# Patient Record
Sex: FEMALE | Race: WHITE | NOT HISPANIC OR LATINO | URBAN - METROPOLITAN AREA
[De-identification: names, ages, dates, MRNs, and addresses within clinical notes are randomized per-mention and may not be internally consistent; named-entity substitution may affect disease eponyms.]

---

## 2022-07-07 ENCOUNTER — EMERGENCY (EMERGENCY)
Facility: HOSPITAL | Age: 42
LOS: 0 days | Discharge: HOME | End: 2022-07-07
Attending: EMERGENCY MEDICINE
Payer: SELF-PAY

## 2022-07-07 VITALS — HEIGHT: 66 IN | WEIGHT: 130.07 LBS

## 2022-07-07 VITALS
RESPIRATION RATE: 18 BRPM | OXYGEN SATURATION: 98 % | HEART RATE: 58 BPM | DIASTOLIC BLOOD PRESSURE: 66 MMHG | TEMPERATURE: 99 F | SYSTOLIC BLOOD PRESSURE: 115 MMHG

## 2022-07-07 DIAGNOSIS — J04.0 ACUTE LARYNGITIS: ICD-10-CM

## 2022-07-07 DIAGNOSIS — R49.0 DYSPHONIA: ICD-10-CM

## 2022-07-07 DIAGNOSIS — Z20.822 CONTACT WITH AND (SUSPECTED) EXPOSURE TO COVID-19: ICD-10-CM

## 2022-07-07 DIAGNOSIS — E03.9 HYPOTHYROIDISM, UNSPECIFIED: ICD-10-CM

## 2022-07-07 LAB
RAPID RVP RESULT: SIGNIFICANT CHANGE UP
SARS-COV-2 RNA SPEC QL NAA+PROBE: SIGNIFICANT CHANGE UP

## 2022-07-07 PROCEDURE — 0225U NFCT DS DNA&RNA 21 SARSCOV2: CPT

## 2022-07-07 PROCEDURE — 99283 EMERGENCY DEPT VISIT LOW MDM: CPT

## 2022-07-07 PROCEDURE — 99284 EMERGENCY DEPT VISIT MOD MDM: CPT

## 2022-07-07 RX ORDER — DEXAMETHASONE 0.5 MG/5ML
10 ELIXIR ORAL ONCE
Refills: 0 | Status: COMPLETED | OUTPATIENT
Start: 2022-07-07 | End: 2022-07-07

## 2022-07-07 RX ORDER — DIPHENHYDRAMINE HCL 50 MG
50 CAPSULE ORAL ONCE
Refills: 0 | Status: COMPLETED | OUTPATIENT
Start: 2022-07-07 | End: 2022-07-07

## 2022-07-07 RX ADMIN — Medication 50 MILLIGRAM(S): at 21:43

## 2022-07-07 RX ADMIN — Medication 10 MILLIGRAM(S): at 21:43

## 2022-07-07 NOTE — ED STATDOCS - ATTENDING APP SHARED VISIT CONTRIBUTION OF CARE
Attending Contribution to Care: I, Nicole Armstrong, performed the initial face to face bedside interview with this patient regarding history of present illness, review of symptoms and relevant past medical, social and family history.  I completed an independent physical examination.  I was the initial provider who evaluated this patient. I have signed out the follow up of any pending tests (i.e. labs, radiological studies) to the ACP.  I have communicated the patient’s plan of care and disposition with the ACP.

## 2022-07-07 NOTE — ED STATDOCS - ENMT, MLM
Nasal mucosa clear.  Mouth with normal mucosa.  Tongue normal size. Throat has no vesicles, no oropharyngeal exudates and uvula is midline. +Slightly erythema posterior pharynx. +hoarse voice

## 2022-07-07 NOTE — ED STATDOCS - OBJECTIVE STATEMENT
42 y/o female with a PMHx of hypothyroid visiting from [place] presents to the ED with hoarse throat x2 days. Mother notes she has cats at home and is unsure if this may be an allergic reaction to the cats since the patient does not have any at home. Pt initially with b/l itchy eyes and productive cough, now resolved. Pt went to Urgent Care and was give 5 days of steroids.  No fevers.

## 2022-07-07 NOTE — ED STATDOCS - PATIENT PORTAL LINK FT
You can access the FollowMyHealth Patient Portal offered by [facility] by registering at the following website: [email or web address]. By joining [facility]’s FollowMyHealth portal, you will also be able to view your health information using other applications (apps) compatible with our system.

## 2022-07-07 NOTE — ED STATDOCS - NSFOLLOWUPINSTRUCTIONS_ED_ALL_ED_FT
Laryngitis    Side view of head and neck with a close-up of the vocal cords.   Laryngitis is inflammation of the vocal cords that causes symptoms such as hoarseness or loss of voice. The vocal cords are two bands of muscles in your throat. When you speak, these cords come together and vibrate. The vibrations come out through your mouth as sound. When your vocal cords are inflamed, your voice sounds different.    Laryngitis can be temporary (acute) or long-term (chronic). Most cases of acute laryngitis improve with time. Chronic laryngitis is laryngitis that lasts for more than 3 weeks.      What are the causes?    Acute laryngitis may be caused by:  •A viral infection.      •Lots of talking, yelling, or singing. This is also called vocal strain.      •A bacterial infection.      Chronic laryngitis may be caused by:  •Vocal strain or an injury to the vocal cords.      •Acid reflux (gastroesophageal reflux disease, or GERD).      •Allergies, a sinus infection, or postnasal drip.      •Smoking.      •Excessive alcohol use.      •Breathing in chemicals or dust.      •Growths on the vocal cords.        What increases the risk?    The following factors may make you more likely to develop this condition:  •Smoking.      •Alcohol abuse.      •Having allergies.      •Chronic irritants in the workplace, such as toxic fumes.        What are the signs or symptoms?    Symptoms of this condition may include:  •Low, hoarse voice.      •Loss of voice.      •Dry cough.      •Sore or dry throat.      •Stuffy or congested nose.        How is this diagnosed?    This condition may be diagnosed based on:  •Your symptoms and a physical exam.      •Throat culture.      •Blood test.      •A procedure in which your health care provider looks at your vocal cords with a mirror or viewing tube (laryngoscopy).        How is this treated?    Treatment for laryngitis depends on what is causing it. Usually, treatment involves resting your voice and using medicines to soothe your throat.    If your laryngitis is caused by a bacterial infection, you may need to take antibiotic medicine.    If your laryngitis is caused by a growth, you may need to have a procedure to remove it.      Follow these instructions at home:    Medicines     •Take over-the-counter and prescription medicines only as told by your health care provider.      •If you were prescribed an antibiotic medicine, take it as told by your health care provider. Do not stop taking the antibiotic even if you start to feel better.      •Use throat lozenges or sprays to soothe your throat as told by your health care provider.        General instructions   A do not smoke cigarettes sign.  •Talk as little as possible. To do this:  •Write/text instead of talking. Do this until your voice is back to normal.      •Avoid whispering, which can cause vocal strain.        •Gargle with a mixture of salt and water 3–4 times a day or as needed. To make salt water, completely dissolve ½–1 tsp (3–6 g) of salt in 1 cup (237 mL) of warm water.      •Drink enough fluid to keep your urine pale yellow.      •Breathe in moist air. Use a humidifier if you live in a dry climate.      • Do not use any products that contain nicotine or tobacco. These products include cigarettes, chewing tobacco, and vaping devices, such as e-cigarettes. If you need help quitting, ask your health care provider.        Contact a health care provider if:    •You have a fever.      •You have increasing pain.      •Your symptoms do not get better in 2 weeks.        Get help right away if:    •You cough up blood.      •You have difficulty swallowing.      •You have trouble breathing.        Summary    •Laryngitis is inflammation of the vocal cords that causes symptoms such as hoarseness or loss of voice.      •Laryngitis can be temporary or long-term.      •Treatment for laryngitis depends on the cause. It often involves resting your voice and using medicine to soothe your throat.      •Get help right away if you have difficulty swallowing or breathing or if you cough up blood.

## 2022-07-07 NOTE — ED STATDOCS - NS ED ATTENDING STATEMENT MOD
This was a shared visit with the [facility]. I reviewed and verified the documentation and independently performed the documented:

## 2022-07-07 NOTE — ED STATDOCS - CLINICAL SUMMARY MEDICAL DECISION MAKING FREE TEXT BOX
Laryngitis, likely viral, viral testing, Decadron and Benadryl to be given in ED, reassurance, follow up with PMD.

## 2022-07-07 NOTE — ED STATDOCS - PROGRESS NOTE DETAILS
40 yo female presents with hoarse voice, periorbital itchiness x 2 days. Pt recently flew over to visit her mom who had cats and unsure if it was related to that. Pt also states the flight was packed and stuffy. Pt tired to take a covid test at home which was negative. Went to urgent care who started her on steroids but pt states she still feels terrible. -Ajith Schultz PA-C Likely laryngitis/viral. Pt was already given a script for steroids by urgent care. Will d/c home. Strict return precautions given. -Ajith Schultz PA-C